# Patient Record
Sex: FEMALE | Race: BLACK OR AFRICAN AMERICAN | NOT HISPANIC OR LATINO | Employment: OTHER | URBAN - METROPOLITAN AREA
[De-identification: names, ages, dates, MRNs, and addresses within clinical notes are randomized per-mention and may not be internally consistent; named-entity substitution may affect disease eponyms.]

---

## 2018-03-23 ENCOUNTER — TELEPHONE (OUTPATIENT)
Dept: HEMATOLOGY/ONCOLOGY | Facility: CLINIC | Age: 73
End: 2018-03-23

## 2018-03-23 NOTE — TELEPHONE ENCOUNTER
----- Message from Gricelda Hermosillo sent at 3/23/2018 10:54 AM CDT -----  Contact: self  Patient needs first available appointment due to white cell to low and iron is too high. Patiet is being referred by Pattie Mcghee. Please call patient at [phone]. Thanks!

## 2018-03-26 ENCOUNTER — TELEPHONE (OUTPATIENT)
Dept: HEMATOLOGY/ONCOLOGY | Facility: CLINIC | Age: 73
End: 2018-03-26

## 2018-03-26 NOTE — TELEPHONE ENCOUNTER
Returned call to patient who stated she has been referred by Dr. Sloane Dupree (?) in [place]. I informed patient that I will check with our Nurse Navigator to see if she has received a referral and we will get back with her as soon as possible. Patient voiced understanding and appreciation.    This message rouged to Yvrose Soares RN, NN

## 2018-03-26 NOTE — TELEPHONE ENCOUNTER
----- Message from Nimisha Barros sent at 3/26/2018  3:40 PM CDT -----  Contact: Lali  Raina is calling again regarding appointment. Please call [phone]. Thanks!

## 2018-03-28 ENCOUNTER — TELEPHONE (OUTPATIENT)
Dept: HEMATOLOGY/ONCOLOGY | Facility: CLINIC | Age: 73
End: 2018-03-28

## 2018-03-28 DIAGNOSIS — R71.8 ABNORMAL RED BLOOD CELLS: Primary | ICD-10-CM

## 2018-03-28 NOTE — TELEPHONE ENCOUNTER
Spoke with patient regarding referral.  Date, time and location confirmed with patient.  PT verbalized understanding.

## 2018-04-13 ENCOUNTER — OFFICE VISIT (OUTPATIENT)
Dept: HEMATOLOGY/ONCOLOGY | Facility: CLINIC | Age: 73
End: 2018-04-13
Payer: MEDICARE

## 2018-04-13 ENCOUNTER — LAB VISIT (OUTPATIENT)
Dept: LAB | Facility: HOSPITAL | Age: 73
End: 2018-04-13
Attending: INTERNAL MEDICINE
Payer: MEDICARE

## 2018-04-13 VITALS
HEIGHT: 67 IN | TEMPERATURE: 98 F | WEIGHT: 161.38 LBS | BODY MASS INDEX: 25.33 KG/M2 | HEART RATE: 80 BPM | RESPIRATION RATE: 20 BRPM | SYSTOLIC BLOOD PRESSURE: 166 MMHG | DIASTOLIC BLOOD PRESSURE: 81 MMHG

## 2018-04-13 DIAGNOSIS — R71.8 ABNORMAL RED BLOOD CELLS: ICD-10-CM

## 2018-04-13 DIAGNOSIS — D61.818 PANCYTOPENIA: Primary | ICD-10-CM

## 2018-04-13 DIAGNOSIS — D50.0 ANEMIA DUE TO CHRONIC BLOOD LOSS: ICD-10-CM

## 2018-04-13 DIAGNOSIS — D70.8 OTHER NEUTROPENIA: ICD-10-CM

## 2018-04-13 PROBLEM — D70.9 NEUTROPENIA: Status: ACTIVE | Noted: 2018-04-13

## 2018-04-13 LAB
ALBUMIN SERPL BCP-MCNC: 4.3 G/DL
ALP SERPL-CCNC: 45 U/L
ALT SERPL W/O P-5'-P-CCNC: 24 U/L
ANION GAP SERPL CALC-SCNC: 12 MMOL/L
AST SERPL-CCNC: 21 U/L
BASOPHILS # BLD AUTO: 0.01 K/UL
BASOPHILS NFR BLD: 0.3 %
BILIRUB SERPL-MCNC: 0.5 MG/DL
BUN SERPL-MCNC: 15 MG/DL
CALCIUM SERPL-MCNC: 9.7 MG/DL
CHLORIDE SERPL-SCNC: 102 MMOL/L
CO2 SERPL-SCNC: 28 MMOL/L
CREAT SERPL-MCNC: 0.79 MG/DL
DIFFERENTIAL METHOD: ABNORMAL
EOSINOPHIL # BLD AUTO: 0.1 K/UL
EOSINOPHIL NFR BLD: 2.2 %
ERYTHROCYTE [DISTWIDTH] IN BLOOD BY AUTOMATED COUNT: 13.7 %
EST. GFR  (AFRICAN AMERICAN): >60 ML/MIN/1.73 M^2
EST. GFR  (NON AFRICAN AMERICAN): >60 ML/MIN/1.73 M^2
FERRITIN SERPL-MCNC: 221 NG/ML
GLUCOSE SERPL-MCNC: 73 MG/DL
HCT VFR BLD AUTO: 36.6 %
HGB BLD-MCNC: 11.8 G/DL
IRON SATN MFR SERPL: 34 %
IRON SERPL-MCNC: 94 UG/DL
LYMPHOCYTES # BLD AUTO: 0.9 K/UL
LYMPHOCYTES NFR BLD: 29.1 %
MCH RBC QN AUTO: 31.1 PG
MCHC RBC AUTO-ENTMCNC: 32.2 G/DL
MCV RBC AUTO: 96 FL
MONOCYTES # BLD AUTO: 0.6 K/UL
MONOCYTES NFR BLD: 17.3 %
NEUTROPHILS # BLD AUTO: 1.7 K/UL
NEUTROPHILS NFR BLD: 51.1 %
NRBC BLD-RTO: 0 /100 WBC
PLATELET # BLD AUTO: 177 K/UL
PMV BLD AUTO: 10 FL
POTASSIUM SERPL-SCNC: 3.9 MMOL/L
PROT SERPL-MCNC: 6.9 G/DL
RBC # BLD AUTO: 3.8 M/UL
SODIUM SERPL-SCNC: 142 MMOL/L
TOTAL IRON BINDING CAPACITY: 279 UG/DL
WBC # BLD AUTO: 3.23 K/UL

## 2018-04-13 PROCEDURE — 99999 PR PBB SHADOW E&M-EST. PATIENT-LVL III: CPT | Mod: PBBFAC,,, | Performed by: INTERNAL MEDICINE

## 2018-04-13 PROCEDURE — 85025 COMPLETE CBC W/AUTO DIFF WBC: CPT | Mod: PN

## 2018-04-13 PROCEDURE — 82728 ASSAY OF FERRITIN: CPT | Mod: PN

## 2018-04-13 PROCEDURE — 83540 ASSAY OF IRON: CPT

## 2018-04-13 PROCEDURE — 80053 COMPREHEN METABOLIC PANEL: CPT

## 2018-04-13 PROCEDURE — 82728 ASSAY OF FERRITIN: CPT

## 2018-04-13 PROCEDURE — 99204 OFFICE O/P NEW MOD 45 MIN: CPT | Mod: S$GLB,,, | Performed by: INTERNAL MEDICINE

## 2018-04-13 PROCEDURE — 83540 ASSAY OF IRON: CPT | Mod: PN

## 2018-04-13 PROCEDURE — 80053 COMPREHEN METABOLIC PANEL: CPT | Mod: PN

## 2018-04-13 PROCEDURE — 36415 COLL VENOUS BLD VENIPUNCTURE: CPT | Mod: PN

## 2018-04-13 PROCEDURE — 85025 COMPLETE CBC W/AUTO DIFF WBC: CPT

## 2018-04-13 RX ORDER — HYDROXYZINE HYDROCHLORIDE 25 MG/1
25 TABLET, FILM COATED ORAL
COMMUNITY
Start: 2018-04-05

## 2018-04-13 RX ORDER — BLACK COHOSH ROOT 540 MG
CAPSULE ORAL
COMMUNITY

## 2018-04-13 RX ORDER — FLUOXETINE HYDROCHLORIDE 40 MG/1
40 CAPSULE ORAL DAILY
COMMUNITY
Start: 2018-03-29

## 2018-04-13 RX ORDER — BACLOFEN 10 MG/1
10 TABLET ORAL
COMMUNITY
Start: 2018-03-21

## 2018-04-13 RX ORDER — ASCORBIC ACID 1000 MG
TABLET ORAL
COMMUNITY
End: 2021-04-15

## 2018-04-13 NOTE — LETTER
April 13, 2018      Sloane Mcghee MD  [address] [facility] [place] [facility] [place] [address]           [facility] [address] [address]  Phone: [phone]  Fax: [phone]          Patient: Lali Cardenas   MR Number: [id number]   YOB: 1945   Date of Visit: 4/13/2018       Dear Dr. Sloane Mcghee:    Thank you for referring Lali Cardenas to me for evaluation. Attached you will find relevant portions of my assessment and plan of care.    If you have questions, please do not hesitate to call me. I look forward to following Lali Cardenas along with you.    Sincerely,    Venice Haq MD    Enclosure  CC:  No Recipients    If you would like to receive this communication electronically, please contact [email or web address] or [phone] to request more information on [facility] Link access.    For providers and/or their staff who would like to refer a patient to [facility], please contact us through our one-stop-shop provider referral line, [facility], at [phone].    If you feel you have received this communication in error or would no longer like to receive these types of communications, please e-mail [email or web address]

## 2018-04-13 NOTE — PROGRESS NOTES
Subjective:       Patient ID: Lali Cardenas is a 72 y.o. female.    Chief Complaint: sent for anemia and neutropenia, takes care of  and it has been difficult  HPI:     Feeling better after an aide has come in    REVIEW OF SYSTEMS:     CONSTITUTIONAL: The patient denies any weight change. There is no apparent    change in appetite, fever, night sweats, headaches, fatigue, dizziness, or    weakness.      SKIN: Denies rash, issues with nails, non-healing sores, bleeding, blotching    skin or abnormal bruising. Denies new moles or changes to existing moles.      BREASTS: There is no swelling around breasts or nipple discharge.    EYES: Denies eye pain, blurred vision, swelling, redness or discharge.      ENT AND MOUTH: Denies runny nose, stuffiness, sinus trouble or sores. Denies    nosebleeds. Denies, hoarseness, change in voice or swelling in front of the    neck.      CARDIOVASCULAR: Denies chest pain, discomfort or palpitations. Denies neck    swelling or episodes of passing out.      RESPIRATORY: Denies cough, sputum production, blood in sputum, and denies    shortness of breath.      GI: Denies trouble swallowing, indigestion, heartburn, abdominal pain, nausea,    vomiting, diarrhea, altered bowel habits, blood in stool, discoloration of    stools, change in nature of stool, bloating, increased abdominal girth.      GENITOURINARY: No discharge. No pelvic pain or lumps. No rash around groin or  lesions. No urinary frequency, hesitation, painful urination or blood in    urine. Denies incontinence. No problems with intercourse.      MUSCULOSKELETAL: Denies neck or back pain. Denies weakness in arms or legs,    joint problems or distended inflamed veins in legs. Denies swelling or abnormal  glands.      NEUROLOGICAL: Denies tingling, numbness, altered mentation changes to nerve    function in the face, weakness to one or both of the body. Denies changes to    gait and denies multiple falls or accidents.       PSYCHIATRIC: Denies nervousness, anxiety, hallucinations, depression, suicidal    ideation, trouble sleeping or changes in behavior noticed by family.      The patient denies recent foreign travel or recent exposure to chemicals or    products of concern or infectious diseases.     PHYSICAL EXAM:     Wt Readings from Last 3 Encounters:   04/13/18 73.2 kg (161 lb 6 oz)     Temp Readings from Last 3 Encounters:   04/13/18 98.1 °F (36.7 °C) (Oral)     BP Readings from Last 3 Encounters:   04/13/18 (!) 166/81     Pulse Readings from Last 3 Encounters:   04/13/18 80       GENERAL: Comfortable looking patient. Patient is in no distress.  Awake, alert and oriented to time, person and place.  No anxiety, or agitation.      HEENT: Normal conjunctivae and eyelids. WNL.  PERRLA 3 to 4 mm. No icterus, no pallor, no congestion, and no discharge noted.     NECK:  Supple. Trachea is central.  No crepitus.  No JVD or masses.    RESPIRATORY:  No intercostal retractions.  No dullness to percussion.  Chest is clear to auscultation.  No rales, rhonchi or wheezes.  No crepitus.  Good air entry bilaterally.    CARDIOVASCULAR:  S1 and S2 are normally heard without murmurs or gallops.  All peripheral pulses are present.    ABDOMEN:  Normal abdomen.  No hepatosplenomegaly.  No free fluid.  Bowel sounds are present.  No hernia noted. No masses.  No rebound or tenderness.  No guarding or rigidity.  Umbilicus is midline.    LYMPHATICS:  No axillary, cervical, supraclavicular, submental, or inguinal lymphadenopathy.    SKIN/MUSCULOSKELETAL:  There is no evidence of excoriation marks or ecchmosis.  No rashes.  No cyanosis.  No clubbing.  No joint or skeletal deformities noted.  Normal range of motion.    NEUROLOGIC:  Higher functions are appropriate.  No cranial nerve deficits.  Normal syeda.  Normal strength.  Motor and sensory functions are normal.  Deep tendon reflexes are normal.    GENITAL/RECTAL:  Exams are deferred.      Laboratory:      CBC:  Lab Results   Component Value Date    WBC 3.23 (L) 04/13/2018    HGB 11.8 (L) 04/13/2018    HCT 36.6 (L) 04/13/2018    MCV 96 04/13/2018     04/13/2018     CMP  Sodium   Date Value Ref Range Status   04/13/2018 142 136 - 145 mmol/L Final     Potassium   Date Value Ref Range Status   04/13/2018 3.9 3.5 - 5.1 mmol/L Final     Chloride   Date Value Ref Range Status   04/13/2018 102 95 - 110 mmol/L Final     CO2   Date Value Ref Range Status   04/13/2018 28 22 - 31 mmol/L Final     Glucose   Date Value Ref Range Status   04/13/2018 73 70 - 110 mg/dL Final     Comment:     The ADA recommends the following guidelines for fasting glucose:  Normal:       less than 100 mg/dL  Prediabetes:  100 mg/dL to 125 mg/dL  Diabetes:     126 mg/dL or higher       BUN, Bld   Date Value Ref Range Status   04/13/2018 15 7 - 18 mg/dL Final     Creatinine   Date Value Ref Range Status   04/13/2018 0.79 0.50 - 1.40 mg/dL Final     Calcium   Date Value Ref Range Status   04/13/2018 9.7 8.4 - 10.2 mg/dL Final     Total Protein   Date Value Ref Range Status   04/13/2018 6.9 6.0 - 8.4 g/dL Final     Albumin   Date Value Ref Range Status   04/13/2018 4.3 3.5 - 5.2 g/dL Final     Total Bilirubin   Date Value Ref Range Status   04/13/2018 0.5 0.2 - 1.3 mg/dL Final     Alkaline Phosphatase   Date Value Ref Range Status   04/13/2018 45 38 - 145 U/L Final     AST   Date Value Ref Range Status   04/13/2018 21 14 - 36 U/L Final     ALT   Date Value Ref Range Status   04/13/2018 24 10 - 44 U/L Final     Anion Gap   Date Value Ref Range Status   04/13/2018 12 8 - 16 mmol/L Final     eGFR if    Date Value Ref Range Status   04/13/2018 >60 >60 mL/min/1.73 m^2 Final     eGFR if non    Date Value Ref Range Status   04/13/2018 >60 >60 mL/min/1.73 m^2 Final     Comment:     Calculation used to obtain the estimated glomerular filtration  rate (eGFR) is the CKD-EPI equation.          Assessment/Plan:       Mild  anemia and neutropenia: stable and pt has situational fatigue, she herslef appears well, and will cont to monitor   iron wnl   will get cbc, b12, hgb elsctrophoresis and watch counts beofre further w/u

## 2018-07-17 ENCOUNTER — TELEPHONE (OUTPATIENT)
Dept: HEMATOLOGY/ONCOLOGY | Facility: CLINIC | Age: 73
End: 2018-07-17

## 2018-07-17 DIAGNOSIS — D50.0 ANEMIA DUE TO CHRONIC BLOOD LOSS: ICD-10-CM

## 2018-07-17 DIAGNOSIS — D61.818 PANCYTOPENIA: ICD-10-CM

## 2018-07-17 DIAGNOSIS — R71.8 ABNORMAL RED BLOOD CELLS: Primary | ICD-10-CM

## 2018-07-17 NOTE — TELEPHONE ENCOUNTER
Spoke with Pt/Cg about having to r/s Pt's appt for Friday with Dr Haq due to pt did not get her scheduled labs done. Pt to be scheduled for labs and f/u appt with Dr Haq and a copy of the appts to mailed to pt. Pt verbalized understanding.

## 2019-04-23 ENCOUNTER — APPOINTMENT (RX ONLY)
Dept: URBAN - METROPOLITAN AREA CLINIC 61 | Facility: CLINIC | Age: 74
Setting detail: DERMATOLOGY
End: 2019-04-23

## 2019-04-23 DIAGNOSIS — L28.0 LICHEN SIMPLEX CHRONICUS: ICD-10-CM

## 2019-04-23 DIAGNOSIS — L21.8 OTHER SEBORRHEIC DERMATITIS: ICD-10-CM

## 2019-04-23 PROBLEM — I10 ESSENTIAL (PRIMARY) HYPERTENSION: Status: ACTIVE | Noted: 2019-04-23

## 2019-04-23 PROBLEM — E78.5 HYPERLIPIDEMIA, UNSPECIFIED: Status: ACTIVE | Noted: 2019-04-23

## 2019-04-23 PROBLEM — J44.9 CHRONIC OBSTRUCTIVE PULMONARY DISEASE, UNSPECIFIED: Status: ACTIVE | Noted: 2019-04-23

## 2019-04-23 PROBLEM — E03.9 HYPOTHYROIDISM, UNSPECIFIED: Status: ACTIVE | Noted: 2019-04-23

## 2019-04-23 PROCEDURE — ? PRESCRIPTION

## 2019-04-23 PROCEDURE — ? INTRALESIONAL KENALOG

## 2019-04-23 PROCEDURE — 11900 INJECT SKIN LESIONS </W 7: CPT

## 2019-04-23 PROCEDURE — 99202 OFFICE O/P NEW SF 15 MIN: CPT | Mod: 25

## 2019-04-23 PROCEDURE — ? COUNSELING

## 2019-04-23 RX ORDER — TRIAMCINOLONE ACETONIDE 1 MG/G
CREAM TOPICAL
Qty: 1 | Refills: 1 | Status: ERX | COMMUNITY
Start: 2019-04-23

## 2019-04-23 RX ADMIN — TRIAMCINOLONE ACETONIDE: 1 CREAM TOPICAL at 17:02

## 2019-04-23 ASSESSMENT — LOCATION ZONE DERM
LOCATION ZONE: NECK
LOCATION ZONE: EAR

## 2019-04-23 ASSESSMENT — LOCATION SIMPLE DESCRIPTION DERM
LOCATION SIMPLE: RIGHT EAR
LOCATION SIMPLE: POSTERIOR NECK
LOCATION SIMPLE: LEFT EAR

## 2019-04-23 ASSESSMENT — LOCATION DETAILED DESCRIPTION DERM
LOCATION DETAILED: MID POSTERIOR NECK
LOCATION DETAILED: RIGHT POSTERIOR NECK
LOCATION DETAILED: LEFT POSTERIOR EAR
LOCATION DETAILED: RIGHT POSTERIOR EAR

## 2019-04-23 NOTE — PROCEDURE: INTRALESIONAL KENALOG
Total Volume Injected (Ccs- Only Use Numbers And Decimals): 1
Medical Necessity Clause: This procedure was medically necessary because the lesions that were treated were:
Concentration Of Solution Injected (Mg/Ml): 10.0
Include Z78.9 (Other Specified Conditions Influencing Health Status) As An Associated Diagnosis?: No
Expiration Date (Optional): 09/2020
Detail Level: Detailed
Kenalog Preparation: Kenalog
Administered By (Optional): Dr. Spencer
X Size Of Lesion In Cm (Optional): 0
Consent: The risks of atrophy were reviewed with the patient.
Lot # (Optional): [id number]

## 2021-04-20 PROBLEM — M17.11 PRIMARY OSTEOARTHRITIS OF RIGHT KNEE: Status: ACTIVE | Noted: 2021-04-20

## 2021-04-27 ENCOUNTER — EXTERNAL HOME HEALTH (OUTPATIENT)
Dept: HOME HEALTH SERVICES | Facility: HOSPITAL | Age: 76
End: 2021-04-27

## 2021-04-30 ENCOUNTER — DOCUMENT SCAN (OUTPATIENT)
Dept: HOME HEALTH SERVICES | Facility: HOSPITAL | Age: 76
End: 2021-04-30

## 2022-09-27 ENCOUNTER — TELEPHONE (OUTPATIENT)
Dept: NEUROLOGY | Facility: CLINIC | Age: 77
End: 2022-09-27
Payer: MEDICARE

## 2025-02-10 ENCOUNTER — TELEPHONE (OUTPATIENT)
Dept: PSYCHIATRY | Facility: CLINIC | Age: 80
End: 2025-02-10
Payer: MEDICARE